# Patient Record
Sex: MALE
[De-identification: names, ages, dates, MRNs, and addresses within clinical notes are randomized per-mention and may not be internally consistent; named-entity substitution may affect disease eponyms.]

---

## 2020-03-05 PROBLEM — Z00.00 ENCOUNTER FOR PREVENTIVE HEALTH EXAMINATION: Status: ACTIVE | Noted: 2020-03-05

## 2020-03-06 ENCOUNTER — APPOINTMENT (OUTPATIENT)
Dept: ORTHOPEDIC SURGERY | Facility: CLINIC | Age: 35
End: 2020-03-06
Payer: MEDICAID

## 2020-03-06 VITALS
BODY MASS INDEX: 26.37 KG/M2 | HEIGHT: 67.5 IN | SYSTOLIC BLOOD PRESSURE: 125 MMHG | WEIGHT: 170 LBS | DIASTOLIC BLOOD PRESSURE: 80 MMHG | HEART RATE: 69 BPM

## 2020-03-06 DIAGNOSIS — M67.431 GANGLION, RIGHT WRIST: ICD-10-CM

## 2020-03-06 DIAGNOSIS — Z78.9 OTHER SPECIFIED HEALTH STATUS: ICD-10-CM

## 2020-03-06 DIAGNOSIS — J45.909 UNSPECIFIED ASTHMA, UNCOMPLICATED: ICD-10-CM

## 2020-03-06 PROCEDURE — 99204 OFFICE O/P NEW MOD 45 MIN: CPT

## 2020-03-06 PROCEDURE — 73110 X-RAY EXAM OF WRIST: CPT | Mod: RT

## 2020-03-07 PROBLEM — J45.909 ASTHMA: Status: RESOLVED | Noted: 2020-03-07 | Resolved: 2020-03-07

## 2020-03-07 PROBLEM — Z78.9 NO PERTINENT FAMILY HISTORY: Status: ACTIVE | Noted: 2020-03-07

## 2020-03-07 PROBLEM — Z78.9 CURRENT NON-SMOKER: Status: ACTIVE | Noted: 2020-03-07

## 2020-03-07 PROBLEM — M67.431 GANGLION OF RIGHT WRIST: Status: ACTIVE | Noted: 2020-03-07

## 2020-03-07 NOTE — HISTORY OF PRESENT ILLNESS
[Left] : left hand dominant [FreeTextEntry1] : He presents today for evaluation of his right dorsal wrist mass. It has been occurring since 2015. It is painful. It limits wrist extension. He has had no prior aspirations of the mass. Denies any recent trauma. Denies numbness/tingling. Denies clicking or locking of the digits.

## 2020-03-07 NOTE — PHYSICAL EXAM
[de-identified] : Constitutional: Alert and in no acute distress.\par Psychiatric: Oriented to person, place, and time. Insight and judgement were intact and the affect was normal.\par Cardiovascular: Regular rate assessed through peripheral pulses.\par Pulmonary: Nonlabored breathing on room air.\par Lymphatics: No peripheral lymphadenopathy appreciated.\par \par Musculoskeletal:\par \par Cervical spine mobility is full in all directions. \par \par No skin changes are noted to the upper extremities. Muscle bulk and contour are within normal limits without evidence of atrophy. On the right wrist, there is a 2x2 cm dorsal radial wrist mass with no overlying skin changes.\par \par Mobility is full about the elbows with flexion and extension. Forearm supination measures 85/85 degrees. Forearm pronation measures 85/85 degrees. Wrist flexion measured 65/75 degrees. Wrist extension measures 55/65 degrees. Digital flexion and extension is full. Thumb opposition is full to the base of the small fingers bilaterally. Thumb abduction measures 5/5 in strength. Interosseous abduction measures 5/5 in strength. No cords or nodules are palpated. No triggering is observed. No tenderness over the thumb CMC articulations is observed. Scaphoid shift test is negative. Finkelstein test is negative. Scapholunate and lunotriquetral ballottement test are negative. Ulnar snuffbox tenderness is negative. Ulnar impingement test is negative. DRUJ piano key test is negative.\par \par Sensation is intact to light touch in the ulnar, median, and radial nerve distributions. Carpal tunnel provocative maneuvers are negative. Cubital tunnel provocative maneuvers are negative. Fingers are pink and well perfused. Capillary refill is brisk.  [de-identified] : Three views of the right wrist were obtained and reviewed. No evidence of fracture/dislocation. No SL widening on  view. No foreign body. Joint space is well maintained.

## 2020-03-07 NOTE — ASSESSMENT
[FreeTextEntry1] : 34 year old male presents with right dorsal wrist mass. Treatment options were discussed, including operative and nonoperative treatment. At this time, the patient has opted for operative treatment. Risks, benefits and alternatives were discussed. Risks include but are not limited to the risks associated with anesthesia and the risks associated with the surgery. These include stiffness, need for additional procedures, damage to surrounding structures, infection, nonunion and malunion. I believe the patient understands these risks. The proposed procedure is right dorsal wrist mass excision. He will be scheduled at the next available date.  [B58.656] : oral language in English score

## 2020-03-17 ENCOUNTER — APPOINTMENT (OUTPATIENT)
Dept: ORTHOPEDIC SURGERY | Facility: HOSPITAL | Age: 35
End: 2020-03-17

## 2020-03-31 ENCOUNTER — APPOINTMENT (OUTPATIENT)
Dept: ORTHOPEDIC SURGERY | Facility: HOSPITAL | Age: 35
End: 2020-03-31

## 2020-04-10 ENCOUNTER — APPOINTMENT (OUTPATIENT)
Dept: ORTHOPEDIC SURGERY | Facility: CLINIC | Age: 35
End: 2020-04-10

## 2021-01-14 ENCOUNTER — TRANSCRIPTION ENCOUNTER (OUTPATIENT)
Age: 36
End: 2021-01-14

## 2021-02-09 ENCOUNTER — TRANSCRIPTION ENCOUNTER (OUTPATIENT)
Age: 36
End: 2021-02-09

## 2021-03-30 ENCOUNTER — APPOINTMENT (OUTPATIENT)
Dept: DISASTER EMERGENCY | Facility: OTHER | Age: 36
End: 2021-03-30
Payer: MEDICAID

## 2021-03-30 PROCEDURE — 0001A: CPT

## 2021-04-20 ENCOUNTER — APPOINTMENT (OUTPATIENT)
Dept: DISASTER EMERGENCY | Facility: OTHER | Age: 36
End: 2021-04-20